# Patient Record
Sex: FEMALE | Race: WHITE | NOT HISPANIC OR LATINO | Employment: OTHER | URBAN - METROPOLITAN AREA
[De-identification: names, ages, dates, MRNs, and addresses within clinical notes are randomized per-mention and may not be internally consistent; named-entity substitution may affect disease eponyms.]

---

## 2021-02-21 ENCOUNTER — HOSPITAL ENCOUNTER (EMERGENCY)
Facility: HOSPITAL | Age: 76
Discharge: HOME | End: 2021-02-21
Attending: EMERGENCY MEDICINE | Admitting: EMERGENCY MEDICINE

## 2021-02-21 VITALS
HEIGHT: 66 IN | SYSTOLIC BLOOD PRESSURE: 136 MMHG | RESPIRATION RATE: 18 BRPM | DIASTOLIC BLOOD PRESSURE: 66 MMHG | HEART RATE: 64 BPM | TEMPERATURE: 97.9 F | OXYGEN SATURATION: 95 % | BODY MASS INDEX: 25.71 KG/M2 | WEIGHT: 160 LBS

## 2021-02-21 DIAGNOSIS — S29.011A CHEST WALL MUSCLE STRAIN, INITIAL ENCOUNTER: Primary | ICD-10-CM

## 2021-02-21 PROCEDURE — 99283 EMERGENCY DEPT VISIT LOW MDM: CPT

## 2021-02-21 RX ORDER — CARBAMAZEPINE 200 MG/1
300 TABLET ORAL 3 TIMES DAILY
COMMUNITY

## 2021-02-21 RX ORDER — METHOCARBAMOL 500 MG/1
500 TABLET, FILM COATED ORAL 4 TIMES DAILY
Qty: 20 TABLET | Refills: 0 | Status: SHIPPED | OUTPATIENT
Start: 2021-02-21 | End: 2021-02-26

## 2021-02-21 RX ORDER — ACETAMINOPHEN 500 MG
500 TABLET ORAL EVERY 6 HOURS PRN
COMMUNITY

## 2021-02-21 RX ORDER — PREGABALIN 50 MG/1
50 CAPSULE ORAL 3 TIMES DAILY
COMMUNITY

## 2021-02-21 RX ORDER — ZONISAMIDE 100 MG/1
100 CAPSULE ORAL DAILY
COMMUNITY

## 2021-02-21 RX ORDER — HYDROCODONE BITARTRATE AND ACETAMINOPHEN 5; 325 MG/1; MG/1
1 TABLET ORAL EVERY 6 HOURS PRN
Qty: 12 TABLET | Refills: 0 | Status: SHIPPED | OUTPATIENT
Start: 2021-02-21

## 2025-06-06 ENCOUNTER — TELEPHONE (OUTPATIENT)
Dept: PHYSICAL THERAPY | Facility: CLINIC | Age: 80
End: 2025-06-06
Payer: MEDICARE

## 2025-06-06 NOTE — TELEPHONE ENCOUNTER
Caller: Jil diez    Relationship: Emergency Contact    Best call back number: [phone]    CALLING ON BEHALF OF HER MOTHER, HAND SPECIALIST AT [facility] REFERRED HER TO JOLIE, SHE IS HAVING AN ORDER FAXED OVER, PLEASE CALL BACK TO GET PATIENT SCHEDULED

## 2025-06-25 ENCOUNTER — TREATMENT (OUTPATIENT)
Dept: PHYSICAL THERAPY | Facility: CLINIC | Age: 80
End: 2025-06-25
Payer: MEDICARE

## 2025-06-25 DIAGNOSIS — S54.01XA DAMAGE TO RIGHT ULNAR NERVE, INITIAL ENCOUNTER: Primary | ICD-10-CM

## 2025-06-25 DIAGNOSIS — M66.241 NONTRAUMATIC SUBLUXATION OF EXTENSOR TENDON AT MCP JOINT OF HAND, RIGHT: ICD-10-CM

## 2025-06-25 NOTE — PROGRESS NOTES
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 Nontraumatic subluxation of extensor tendon at MCP joint of hand, right  M66.241 727.63         Subjective Questionnaire: QuickDASH: 61.36%    Subjective Evaluation    History of Present Illness  Mechanism of injury: She went to [facility] around 2025 due to neurological symptoms and exhaustion.   Noticed tingling in right hand when she left. Symptoms were tingling into right SF/RF.  Right SF/RF developed a claw deformity.     CURRENT COMPLAINT  Right ring/small finger experiencing intermittent pain. Fingers are sticking out.  When fingers are flexed, having difficultly straightening fingers out. Tingling is mostly when she 1st wakes up.  Intermittent left UE numbness.     OTHER:  Uses rollator. [facility]. Has difficultly turning neck to the left.  [facility] over 4 months 2x/week.  4 falls in past year, with concussions.     Hand dominance: right    Diagnostic Tests  Abnormal EMG results: Scheduled at [facility] next week..           Objective          Observations     Right Wrist/Hand   Positive for atrophy (Intrinsic).       Tenderness     Right Elbow   No tenderness in the cubital tunnel.     Additional Tenderness Details  Tender over EDC tendons at MCP joint of right Long/ring/small fingers.    Active Range of Motion   Cervical/Thoracic Spine   Cervical    Left rotation: Neck active rotation left: Limited ROM. 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  Full finger ROM, but subluxation of EDC tendons at MCP joints of Right hand long/ring/small fingers.    Tests   Cervical     Left   Positive Spurling's sign.     Right   Positive Spurling's sign.   Negative ULTT1, ULTT2, ULTT3 and ULTT4.     Right Elbow   Negative Tinel's sign (cubital tunnel).     Right Wrist/Hand   Negative intrinsic muscle tightness, Phalen's sign, Tinel's sign (medial nerve) and Tinel's sign (radial tunnel).           Assessment & Plan       Assessment  Impairments: abnormal or restricted ROM, activity intolerance and pain with function   Functional limitations: uncomfortable because of pain and unable to perform repetitive tasks   Assessment details: Patient is an 79-year-old right-handed female presenting with intermittent numbness into the hand and difficulty extending the fingers of her right hand after she flexes them.  She had negative cubital tunnel test, negative carpal tunnel test.  She did have positive Spurling's maneuver to the cervical spine to the right and left with reproduction of the numbness she has been complaining.  I believe this explains the numbness issues, she is currently going to another physical therapy clinic for treatment of the neck sounds as though they do not everything appropriately.    Her difficulty with extending the fingers on the right hand after the flex appears to be multiple finger EDC subluxation at the MCP joints of the long finger/ring finger/small finger.  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  If she just had 1 finger subblock seen we might be able to splint her however she has 3  fingers [id number] that are experiencing subluxation of the EDC tendon dorsally.  Considering this she is not a candidate for splinting other than to maybe splint at night with her fingers out straight but this would not solve her problem during the day.  Assessment by hand surgeon is appropriate, it sounds as though the patient is not interested in any surgical interventions.    At this time rehab has nothing to offer the patient for hand other than precautions and resting positions to place the hand in to prevent potential of long-term tendon ruptures.  I spent extensive period of time with the patient discussing and educating her on her options and outcomes associated with those options.  At this time the patient chose to just wear off-the-shelf type splint that would prevent full MCP flexion.    Plan  Other planned modality interventions: Patient will not be seen after today for follow-up.  Evaluation only today.  Planned therapy interventions: therapeutic activities and home exercise program  Duration in visits: 1          Timed:         Therapeutic Activity:     24     mins  [id number];           Un-Timed:  Mod Eval     35     Mins  [id number]          Timed Treatment:   24   mins   Total Treatment:     59   mins          PT: Floyd Fajardo PT, [facility]     License Number: [id number]  Electronically signed by Floyd Fajardo PT, 06/25/25, 1:17 PM EDT    Medicare Initial Certification  Certification Period: 9/23/2025  I certify that the therapy services are furnished while this patient is under my care.  The services outlined above are required by this patient, and will be reviewed every 90 days.     PHYSICIAN: ________________________________________________________  Albert Ferrell MD         DATE: ____________________________________________________________    Please sign and return via fax to [phone]. Thank you, [facility].

## 2025-06-30 ENCOUNTER — TRANSCRIBE ORDERS (OUTPATIENT)
Dept: PHYSICAL THERAPY | Facility: CLINIC | Age: 80
End: 2025-06-30
Payer: MEDICARE